# Patient Record
(demographics unavailable — no encounter records)

---

## 2024-10-18 NOTE — HISTORY OF PRESENT ILLNESS
[FreeTextEntry1] : Sisters x 2 with breast cancer at ages 40 and 48. Father and brother with gastric cancer,   BRCA2 positive Refuses risk reducing surgery.  Patient denies any breast masses or nipple discharge.  Patient has been following up with gynecology and has had a transvaginal ultrasound.  Recent mammogram and ultrasound were unremarkable.  Recently she had a mammogram which showed a right breast asymmetry diagnostic review revealed small focus that on ultrasound represented a simple cyst, BI-RADS 2.   Patient was diagnosed as being BRCA2 positive but has elected not to undergo risk reducing mastectomy.  However, she has not seen her gynecologist in a while despite this diagnosis.  She is however getting annual breast MRIs.  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  Patient is not taking hormones.

## 2024-10-18 NOTE — REASON FOR VISIT
[Follow-Up: _____] : a [unfilled] follow-up visit [FreeTextEntry1] : Routine follow-up is a high risk patient, BRCA2 positive

## 2025-05-15 NOTE — REASON FOR VISIT
[Follow-Up: _____] : a [unfilled] follow-up visit [FreeTextEntry1] : Routine follow-up is a high risk patient, BRCA2 positive with newly diagnosed left breast cancer

## 2025-05-15 NOTE — HISTORY OF PRESENT ILLNESS
[FreeTextEntry1] : Sisters x 2 with breast cancer at ages 40 and 48. Father and brother with gastric cancer,   BRCA2 positive Refuses risk reducing surgery.  Patient denies any breast masses or nipple discharge.  Patient has been following up with gynecology and has had a transvaginal ultrasound.  Recent MRI showed in the upper outer quadrant of the left breast a 1.7 cm area of clumped enhancement.  Patient had a left MRI guided core biopsy which revealed a high-grade DCIS, stains are pending.  Recently she had a mammogram which showed a right breast asymmetry diagnostic review revealed small focus that on ultrasound represented a simple cyst, BI-RADS 2.   Patient was diagnosed as being BRCA2 positive but has elected not to undergo risk reducing mastectomy.  However, she has not seen her gynecologist in a while despite this diagnosis.  She is however getting annual breast MRIs.  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  Patient is not taking hormones.

## 2025-05-27 NOTE — HISTORY OF PRESENT ILLNESS
[FreeTextEntry1] : 54 y/o woman with left breast DCIS and BRCA mutation indicated for b/l mastectomy with Dr. Millan. Presented to review options for breast reconstruction. Otherwise healthy. Breasts C cup, symmetric, grade 2 ptosis Today we reviewed all options for breast reconstruction including implant and autologous options. Nature of each surgery, its risks, benefits, alternatives, expected postoperative course, recovery and long term results were reviewed. Staged nature of surgery, with a planned revision phase reviewed. Ms. Wilhelm is most interested in RIAN flap breast reconstruction. Risks specific to microsurgical tissue transfer including partial or total flap loss were reviewed. All questions answered. Ms. WILHELM expressed understanding and agreed to proceed. Will obtain preoperative CT angiogram to map vascular perforators. Will coordinate surgery for the near future.

## 2025-05-27 NOTE — HISTORY OF PRESENT ILLNESS
[FreeTextEntry1] : 52 y/o woman with left breast DCIS and BRCA mutation indicated for b/l mastectomy with Dr. Millan. Presented to review options for breast reconstruction. Otherwise healthy. Breasts C cup, symmetric, grade 2 ptosis Today we reviewed all options for breast reconstruction including implant and autologous options. Nature of each surgery, its risks, benefits, alternatives, expected postoperative course, recovery and long term results were reviewed. Staged nature of surgery, with a planned revision phase reviewed. Ms. Wilhelm is most interested in RIAN flap breast reconstruction. Risks specific to microsurgical tissue transfer including partial or total flap loss were reviewed. All questions answered. Ms. WILHELM expressed understanding and agreed to proceed. Will obtain preoperative CT angiogram to map vascular perforators. Will coordinate surgery for the near future.

## 2025-06-24 NOTE — HISTORY OF PRESENT ILLNESS
[FreeTextEntry1] : 54 y/o F presents 6 days post op - S/P B/L RIAN flap reconstruction on 6/18/25. Denies f/c/n/v, reports pain is controlled, has 4 drains in place. She is taking aspirin for VTE PPx.   B/L breasts are soft, incisions c/d/i, B/L flaps are warm with good color and capillary refill B/L breast drains removed, B/L Doppler wires removed  Abdomen soft, non-tender, non-distended, incision c/d/i, umbo viable right abdominal drain removed, left not ready for removal yet, serosanguineous output  No infections or collections   Plan: -post op instructions reviewed  -aquaphor to incisions  -shower regularly -sports bra  -continue aspirin until 2 weeks post op  -activity restrictions reviewed  -f/u in 1 week

## 2025-06-24 NOTE — REASON FOR VISIT
[Post Op: _________] : a [unfilled] post op visit [Family Member] : family member [FreeTextEntry1] : Dr. Millan

## 2025-06-26 NOTE — REASON FOR VISIT
[Post Op: _________] : a [unfilled] post op visit [FreeTextEntry1] : Bilateral nipple sparing mastectomy with Kylee reconstruction

## 2025-06-26 NOTE — HISTORY OF PRESENT ILLNESS
[FreeTextEntry1] : Sisters x 2 with breast cancer at ages 40 and 48. Father and brother with gastric cancer,  2016 BRCA2 positive MRI abnormality biopsy showed high-grade DCIS, ER weakly positive.   bilateral nipple sparing mastectomy with Kylee flap reconstruction Left breast showed DCIS, high-grade with negative margins; right breast showed PASH, subareolar biopsies negative  Patient is doing well after her surgery drains are putting out greater than 20 cc of fluid per day.  Recently she had a mammogram which showed a right breast asymmetry diagnostic review revealed small focus that on ultrasound represented a simple cyst, BI-RADS 2.   Patient was diagnosed as being BRCA2 positive but has elected not to undergo risk reducing mastectomy.  However, she has not seen her gynecologist in a while despite this diagnosis.  She is however getting annual breast MRIs.  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  Patient is not taking hormones.

## 2025-06-26 NOTE — PHYSICAL EXAM
[de-identified] : Status post bilateral nipple sparing mastectomy with Kylee flap reconstruction.  There are some minor ecchymosis and very superficial epidermal lysis in the central portion just above the incision on both sides but no signs of infection.  Nipples are intact with no changes to the skin.

## 2025-07-08 NOTE — HISTORY OF PRESENT ILLNESS
[FreeTextEntry1] : 54 y/o female presents 2 weeks post op, S/p BL Kylee Flap reconstruction on 06/18/2025. Denies f/n/v/c & pain. She has left abdominal ISIDORO drain in site. Taking aspirin once daily for VTE PPx.   B/L breasts are soft, incisions c/d/i, B/L flaps are warm with good color and capillary refill Normal post operative bruising  Abdomen soft, non-tender, non-distended, incision c/d/i, umbo viable left abdominal drain removed No infections or collections  Plan: -post op instructions reviewed -aquaphor to incisions -shower regularly -sports bra -activity restrictions reviewed -complete aspirin course  -f/u in 2 months

## 2025-07-08 NOTE — HISTORY OF PRESENT ILLNESS
[FreeTextEntry1] : 52 y/o female presents 2 weeks post op, S/p BL Kylee Flap reconstruction on 06/18/2025. Denies f/n/v/c & pain. She has left abdominal ISIDORO drain in site. Taking aspirin once daily for VTE PPx.   B/L breasts are soft, incisions c/d/i, B/L flaps are warm with good color and capillary refill Normal post operative bruising  Abdomen soft, non-tender, non-distended, incision c/d/i, umbo viable left abdominal drain removed No infections or collections  Plan: -post op instructions reviewed -aquaphor to incisions -shower regularly -sports bra -activity restrictions reviewed -complete aspirin course  -f/u in 2 months